# Patient Record
Sex: FEMALE | Race: BLACK OR AFRICAN AMERICAN | HISPANIC OR LATINO | URBAN - METROPOLITAN AREA
[De-identification: names, ages, dates, MRNs, and addresses within clinical notes are randomized per-mention and may not be internally consistent; named-entity substitution may affect disease eponyms.]

---

## 2024-01-03 ENCOUNTER — TELEPHONE (OUTPATIENT)
Dept: OBSTETRICS AND GYNECOLOGY | Facility: CLINIC | Age: 29
End: 2024-01-03
Payer: COMMERCIAL

## 2024-01-03 NOTE — TELEPHONE ENCOUNTER
Called patient to reschedule her appointment and left a message due to the patient being scheduled incorrectly.    KELLI PITTMAN MA

## 2024-01-19 PROBLEM — D56.3 BETA THALASSEMIA TRAIT: Status: ACTIVE | Noted: 2023-10-01

## 2024-01-19 PROBLEM — Z14.8 CARRIER OF GENETIC DISORDER: Status: ACTIVE | Noted: 2023-11-21

## 2024-01-19 PROBLEM — S62.111A: Status: RESOLVED | Noted: 2023-12-05 | Resolved: 2024-01-19

## 2024-01-22 ENCOUNTER — OFFICE VISIT (OUTPATIENT)
Dept: PRIMARY CARE | Facility: CLINIC | Age: 29
End: 2024-01-22
Payer: COMMERCIAL

## 2024-01-22 VITALS
WEIGHT: 240 LBS | SYSTOLIC BLOOD PRESSURE: 125 MMHG | HEIGHT: 67 IN | HEART RATE: 112 BPM | DIASTOLIC BLOOD PRESSURE: 75 MMHG | BODY MASS INDEX: 37.67 KG/M2

## 2024-01-22 DIAGNOSIS — J01.00 ACUTE NON-RECURRENT MAXILLARY SINUSITIS: Primary | ICD-10-CM

## 2024-01-22 DIAGNOSIS — Z34.92 SECOND TRIMESTER PREGNANCY (HHS-HCC): ICD-10-CM

## 2024-01-22 PROCEDURE — 1036F TOBACCO NON-USER: CPT | Performed by: FAMILY MEDICINE

## 2024-01-22 PROCEDURE — 99203 OFFICE O/P NEW LOW 30 MIN: CPT | Performed by: FAMILY MEDICINE

## 2024-01-22 RX ORDER — AMOXICILLIN 875 MG/1
875 TABLET, FILM COATED ORAL 2 TIMES DAILY
Qty: 20 TABLET | Refills: 0 | Status: SHIPPED | OUTPATIENT
Start: 2024-01-22 | End: 2024-02-01

## 2024-01-22 NOTE — PROGRESS NOTES
"Subjective   Patient ID: Veena Delvalle is a 28 y.o. female who presents for Sick Visit (Sinus pressure.).      HPI patient is here 28-year-old new patient is in the second trimester pregnancy pregnant with twins states that she has been sick for 3 weeks has been coughing congested has tried home remedies does not take anything over-the-counter continues to have cough cough is getting progressively worse has history of mild asthma denies being short of breath  Current Outpatient Medications on File Prior to Visit   Medication Sig Dispense Refill    -iron fum-folic acid 29 mg iron- 1 mg tablet Take by mouth.       No current facility-administered medications on file prior to visit.        Review of Systems   Constitutional:  Positive for chills, fatigue and fever. Negative for activity change.   HENT:  Positive for congestion, rhinorrhea, sinus pressure, sinus pain and sore throat. Negative for ear discharge.    Eyes:  Negative for discharge and redness.   Respiratory:  Positive for cough. Negative for shortness of breath and wheezing.    Gastrointestinal: Negative.    Genitourinary: Negative.    Musculoskeletal: Negative.    Skin: Negative.    Allergic/Immunologic: Negative.    Neurological: Negative.        Objective   Blood Pressure 125/75   Pulse (Abnormal) 112   Height 1.702 m (5' 7\")   Weight 109 kg (240 lb)   Body Mass Index 37.59 kg/m²   BSA: 2.27 meters squared  Growth percentiles: Facility age limit for growth %antonio is 20 years. Facility age limit for growth %antonio is 20 years.   No visits with results within 1 Week(s) from this visit.   Latest known visit with results is:   No results found for any previous visit.      Physical Exam  Constitutional:       Appearance: Normal appearance.   HENT:      Head: Normocephalic and atraumatic.      Nose: Congestion and rhinorrhea present.      Mouth/Throat:      Pharynx: Posterior oropharyngeal erythema present.   Eyes:      Pupils: Pupils are equal, " round, and reactive to light.   Cardiovascular:      Rate and Rhythm: Normal rate and regular rhythm.      Heart sounds: No murmur heard.  Pulmonary:      Breath sounds: No wheezing or rales.   Abdominal:      Comments: Pregnant   Musculoskeletal:      Cervical back: Normal range of motion.   Neurological:      Mental Status: She is alert.         Assessment/Plan   Problem List Items Addressed This Visit             ICD-10-CM    Acute non-recurrent maxillary sinusitis - Primary J01.00    Relevant Medications    amoxicillin (Amoxil) 875 mg tablet    Second trimester pregnancy Z34.92            .[email or web address]

## 2024-01-23 ENCOUNTER — TELEPHONE (OUTPATIENT)
Dept: OBSTETRICS AND GYNECOLOGY | Facility: CLINIC | Age: 29
End: 2024-01-23
Payer: COMMERCIAL

## 2024-01-23 NOTE — TELEPHONE ENCOUNTER
Left message for the patient to give the office a call in regards to her appointment on the 2/13/24.    KELLI PITTMAN MA

## 2024-01-24 PROBLEM — J01.00 ACUTE NON-RECURRENT MAXILLARY SINUSITIS: Status: ACTIVE | Noted: 2024-01-24

## 2024-01-24 PROBLEM — Z34.92 SECOND TRIMESTER PREGNANCY (HHS-HCC): Status: ACTIVE | Noted: 2024-01-24

## 2024-01-24 ASSESSMENT — ENCOUNTER SYMPTOMS
GASTROINTESTINAL NEGATIVE: 1
ALLERGIC/IMMUNOLOGIC NEGATIVE: 1
NEUROLOGICAL NEGATIVE: 1
EYE REDNESS: 0
SINUS PRESSURE: 1
SHORTNESS OF BREATH: 0
ACTIVITY CHANGE: 0
SINUS PAIN: 1
COUGH: 1
WHEEZING: 0
RHINORRHEA: 1
FATIGUE: 1
EYE DISCHARGE: 0
CHILLS: 1
FEVER: 1
MUSCULOSKELETAL NEGATIVE: 1
SORE THROAT: 1

## 2024-02-01 ENCOUNTER — APPOINTMENT (OUTPATIENT)
Dept: OBSTETRICS AND GYNECOLOGY | Facility: CLINIC | Age: 29
End: 2024-02-01
Payer: COMMERCIAL

## 2024-02-13 ENCOUNTER — APPOINTMENT (OUTPATIENT)
Dept: OBSTETRICS AND GYNECOLOGY | Facility: CLINIC | Age: 29
End: 2024-02-13
Payer: COMMERCIAL

## 2024-03-30 ENCOUNTER — LAB REQUISITION (OUTPATIENT)
Dept: LAB | Facility: HOSPITAL | Age: 29
End: 2024-03-30
Payer: COMMERCIAL

## 2024-03-30 DIAGNOSIS — J02.9 ACUTE PHARYNGITIS, UNSPECIFIED: ICD-10-CM

## 2024-03-30 PROCEDURE — 87651 STREP A DNA AMP PROBE: CPT

## 2024-03-31 LAB — S PYO DNA THROAT QL NAA+PROBE: NOT DETECTED

## 2024-04-01 ENCOUNTER — APPOINTMENT (OUTPATIENT)
Dept: PRIMARY CARE | Facility: CLINIC | Age: 29
End: 2024-04-01
Payer: COMMERCIAL

## 2024-05-08 ENCOUNTER — HOSPITAL ENCOUNTER (OUTPATIENT)
Facility: HOSPITAL | Age: 29
Setting detail: OBSERVATION
LOS: 1 days | Discharge: HOME | End: 2024-05-09
Attending: STUDENT IN AN ORGANIZED HEALTH CARE EDUCATION/TRAINING PROGRAM | Admitting: STUDENT IN AN ORGANIZED HEALTH CARE EDUCATION/TRAINING PROGRAM
Payer: COMMERCIAL

## 2024-05-08 ENCOUNTER — HOSPITAL ENCOUNTER (OUTPATIENT)
Facility: HOSPITAL | Age: 29
Discharge: ANOTHER HOSPITAL | End: 2024-05-08
Attending: OBSTETRICS & GYNECOLOGY | Admitting: OBSTETRICS & GYNECOLOGY
Payer: COMMERCIAL

## 2024-05-08 ENCOUNTER — APPOINTMENT (OUTPATIENT)
Dept: RADIOLOGY | Facility: HOSPITAL | Age: 29
End: 2024-05-08
Payer: COMMERCIAL

## 2024-05-08 ENCOUNTER — APPOINTMENT (OUTPATIENT)
Dept: CARDIOLOGY | Facility: HOSPITAL | Age: 29
End: 2024-05-08
Payer: COMMERCIAL

## 2024-05-08 VITALS
WEIGHT: 285 LBS | RESPIRATION RATE: 18 BRPM | TEMPERATURE: 99.1 F | HEIGHT: 67 IN | HEART RATE: 121 BPM | BODY MASS INDEX: 44.73 KG/M2 | DIASTOLIC BLOOD PRESSURE: 97 MMHG | SYSTOLIC BLOOD PRESSURE: 155 MMHG | OXYGEN SATURATION: 98 %

## 2024-05-08 DIAGNOSIS — O14.10 SEVERE PRE-ECLAMPSIA, ANTEPARTUM (HHS-HCC): ICD-10-CM

## 2024-05-08 DIAGNOSIS — R07.9 CHEST PAIN, UNSPECIFIED: ICD-10-CM

## 2024-05-08 DIAGNOSIS — R06.02 SHORTNESS OF BREATH: Primary | ICD-10-CM

## 2024-05-08 LAB
ALBUMIN SERPL BCP-MCNC: 3 G/DL (ref 3.4–5)
ALP SERPL-CCNC: 276 U/L (ref 33–110)
ALT SERPL W P-5'-P-CCNC: 22 U/L (ref 7–45)
ANION GAP SERPL CALC-SCNC: 12 MMOL/L (ref 10–20)
AST SERPL W P-5'-P-CCNC: 40 U/L (ref 9–39)
BILIRUB SERPL-MCNC: 0.4 MG/DL (ref 0–1.2)
BNP SERPL-MCNC: 362 PG/ML (ref 0–99)
BUN SERPL-MCNC: 9 MG/DL (ref 6–23)
CALCIUM SERPL-MCNC: 8.6 MG/DL (ref 8.6–10.3)
CARDIAC TROPONIN I PNL SERPL HS: 26 NG/L (ref 0–13)
CARDIAC TROPONIN I PNL SERPL HS: 28 NG/L (ref 0–13)
CHLORIDE SERPL-SCNC: 104 MMOL/L (ref 98–107)
CO2 SERPL-SCNC: 27 MMOL/L (ref 21–32)
CREAT SERPL-MCNC: 0.69 MG/DL (ref 0.5–1.05)
EGFRCR SERPLBLD CKD-EPI 2021: >90 ML/MIN/1.73M*2
ERYTHROCYTE [DISTWIDTH] IN BLOOD BY AUTOMATED COUNT: 23.9 % (ref 11.5–14.5)
GLUCOSE SERPL-MCNC: 77 MG/DL (ref 74–99)
HCT VFR BLD AUTO: 24.2 % (ref 36–46)
HGB BLD-MCNC: 7.7 G/DL (ref 12–16)
LDH SERPL L TO P-CCNC: 273 U/L (ref 84–246)
MCH RBC QN AUTO: 22.4 PG (ref 26–34)
MCHC RBC AUTO-ENTMCNC: 31.8 G/DL (ref 32–36)
MCV RBC AUTO: 70 FL (ref 80–100)
NRBC BLD-RTO: 1.1 /100 WBCS (ref 0–0)
PLATELET # BLD AUTO: 238 X10*3/UL (ref 150–450)
POTASSIUM SERPL-SCNC: 4.3 MMOL/L (ref 3.5–5.3)
PROT SERPL-MCNC: 5.3 G/DL (ref 6.4–8.2)
RBC # BLD AUTO: 3.44 X10*6/UL (ref 4–5.2)
SODIUM SERPL-SCNC: 139 MMOL/L (ref 136–145)
WBC # BLD AUTO: 13.1 X10*3/UL (ref 4.4–11.3)

## 2024-05-08 PROCEDURE — 93005 ELECTROCARDIOGRAM TRACING: CPT

## 2024-05-08 PROCEDURE — 36415 COLL VENOUS BLD VENIPUNCTURE: CPT | Performed by: NURSE PRACTITIONER

## 2024-05-08 PROCEDURE — 83615 LACTATE (LD) (LDH) ENZYME: CPT | Performed by: NURSE PRACTITIONER

## 2024-05-08 PROCEDURE — 71275 CT ANGIOGRAPHY CHEST: CPT | Performed by: RADIOLOGY

## 2024-05-08 PROCEDURE — 85027 COMPLETE CBC AUTOMATED: CPT | Performed by: NURSE PRACTITIONER

## 2024-05-08 PROCEDURE — 84484 ASSAY OF TROPONIN QUANT: CPT | Performed by: NURSE PRACTITIONER

## 2024-05-08 PROCEDURE — 99281 EMR DPT VST MAYX REQ PHY/QHP: CPT | Mod: 25

## 2024-05-08 PROCEDURE — 83880 ASSAY OF NATRIURETIC PEPTIDE: CPT | Performed by: NURSE PRACTITIONER

## 2024-05-08 PROCEDURE — 2500000006 HC RX 250 W HCPCS SELF ADMINISTERED DRUGS (ALT 637 FOR ALL PAYERS): Performed by: OBSTETRICS & GYNECOLOGY

## 2024-05-08 PROCEDURE — 80053 COMPREHEN METABOLIC PANEL: CPT | Performed by: NURSE PRACTITIONER

## 2024-05-08 PROCEDURE — 71275 CT ANGIOGRAPHY CHEST: CPT

## 2024-05-08 PROCEDURE — 2550000001 HC RX 255 CONTRASTS: Performed by: OBSTETRICS & GYNECOLOGY

## 2024-05-08 PROCEDURE — 2500000004 HC RX 250 GENERAL PHARMACY W/ HCPCS (ALT 636 FOR OP/ED): Performed by: OBSTETRICS & GYNECOLOGY

## 2024-05-08 RX ORDER — NIFEDIPINE 30 MG/1
30 TABLET, FILM COATED, EXTENDED RELEASE ORAL ONCE
Status: COMPLETED | OUTPATIENT
Start: 2024-05-08 | End: 2024-05-08

## 2024-05-08 RX ORDER — LIDOCAINE HYDROCHLORIDE 10 MG/ML
0.5 INJECTION INFILTRATION; PERINEURAL ONCE AS NEEDED
Status: DISCONTINUED | OUTPATIENT
Start: 2024-05-08 | End: 2024-05-08 | Stop reason: HOSPADM

## 2024-05-08 RX ORDER — FUROSEMIDE 10 MG/ML
40 INJECTION INTRAMUSCULAR; INTRAVENOUS ONCE
Status: COMPLETED | OUTPATIENT
Start: 2024-05-08 | End: 2024-05-08

## 2024-05-08 RX ORDER — ONDANSETRON HYDROCHLORIDE 2 MG/ML
4 INJECTION, SOLUTION INTRAVENOUS EVERY 6 HOURS PRN
Status: DISCONTINUED | OUTPATIENT
Start: 2024-05-08 | End: 2024-05-08 | Stop reason: HOSPADM

## 2024-05-08 RX ORDER — ACETAMINOPHEN 325 MG/1
975 TABLET ORAL ONCE
Status: DISCONTINUED | OUTPATIENT
Start: 2024-05-08 | End: 2024-05-08 | Stop reason: HOSPADM

## 2024-05-08 RX ORDER — NIFEDIPINE 10 MG/1
10 CAPSULE ORAL ONCE AS NEEDED
Status: DISCONTINUED | OUTPATIENT
Start: 2024-05-08 | End: 2024-05-08 | Stop reason: HOSPADM

## 2024-05-08 RX ORDER — HYDRALAZINE HYDROCHLORIDE 20 MG/ML
5 INJECTION INTRAMUSCULAR; INTRAVENOUS ONCE AS NEEDED
Status: DISCONTINUED | OUTPATIENT
Start: 2024-05-08 | End: 2024-05-08 | Stop reason: HOSPADM

## 2024-05-08 RX ORDER — LABETALOL HYDROCHLORIDE 5 MG/ML
20 INJECTION, SOLUTION INTRAVENOUS ONCE AS NEEDED
Status: DISCONTINUED | OUTPATIENT
Start: 2024-05-08 | End: 2024-05-08 | Stop reason: HOSPADM

## 2024-05-08 RX ORDER — ONDANSETRON 4 MG/1
4 TABLET, FILM COATED ORAL EVERY 6 HOURS PRN
Status: DISCONTINUED | OUTPATIENT
Start: 2024-05-08 | End: 2024-05-08 | Stop reason: HOSPADM

## 2024-05-08 RX ORDER — IBUPROFEN 600 MG/1
600 TABLET ORAL ONCE
Status: DISCONTINUED | OUTPATIENT
Start: 2024-05-08 | End: 2024-05-08 | Stop reason: HOSPADM

## 2024-05-08 RX ADMIN — FUROSEMIDE 40 MG: 10 INJECTION, SOLUTION INTRAMUSCULAR; INTRAVENOUS at 20:59

## 2024-05-08 RX ADMIN — IOHEXOL 75 ML: 350 INJECTION, SOLUTION INTRAVENOUS at 19:41

## 2024-05-08 RX ADMIN — NIFEDIPINE 30 MG: 30 TABLET, EXTENDED RELEASE ORAL at 20:54

## 2024-05-08 ASSESSMENT — PAIN DESCRIPTION - LOCATION: LOCATION: CHEST

## 2024-05-08 ASSESSMENT — PAIN SCALES - GENERAL
PAINLEVEL_OUTOF10: 0 - NO PAIN
PAINLEVEL: 8
PAINLEVEL_OUTOF10: 7

## 2024-05-08 ASSESSMENT — COLUMBIA-SUICIDE SEVERITY RATING SCALE - C-SSRS
2. HAVE YOU ACTUALLY HAD ANY THOUGHTS OF KILLING YOURSELF?: NO
6. HAVE YOU EVER DONE ANYTHING, STARTED TO DO ANYTHING, OR PREPARED TO DO ANYTHING TO END YOUR LIFE?: NO
1. IN THE PAST MONTH, HAVE YOU WISHED YOU WERE DEAD OR WISHED YOU COULD GO TO SLEEP AND NOT WAKE UP?: NO

## 2024-05-08 ASSESSMENT — PAIN - FUNCTIONAL ASSESSMENT: PAIN_FUNCTIONAL_ASSESSMENT: 0-10
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
## 2024-05-08 NOTE — PROGRESS NOTES
Pt resting, oxygen saturation %  Labs:                  Troponin I- 28          Hgb 7.7, WBC 13, Plts 238          AST 40, ALT 22,     CT PE and echo ordered stat. Pt and  updated.

## 2024-05-08 NOTE — ED TRIAGE NOTES
Pt arrived to ED with c/o CP and SOB starting to day. Pt is 4 days post partum from having twins via . Pt ambulatory and has no additional complaints.

## 2024-05-09 ENCOUNTER — PHARMACY VISIT (OUTPATIENT)
Dept: PHARMACY | Facility: CLINIC | Age: 29
End: 2024-05-09
Payer: COMMERCIAL

## 2024-05-09 ENCOUNTER — APPOINTMENT (OUTPATIENT)
Dept: CARDIOLOGY | Facility: HOSPITAL | Age: 29
End: 2024-05-09
Payer: COMMERCIAL

## 2024-05-09 VITALS
BODY MASS INDEX: 44.64 KG/M2 | TEMPERATURE: 97.5 F | RESPIRATION RATE: 18 BRPM | SYSTOLIC BLOOD PRESSURE: 141 MMHG | WEIGHT: 284.39 LBS | HEART RATE: 80 BPM | OXYGEN SATURATION: 97 % | DIASTOLIC BLOOD PRESSURE: 99 MMHG | HEIGHT: 67 IN

## 2024-05-09 PROBLEM — O14.10 PRE-ECLAMPSIA, SEVERE (HHS-HCC): Status: ACTIVE | Noted: 2024-05-09

## 2024-05-09 LAB
ABO GROUP (TYPE) IN BLOOD: NORMAL
ALBUMIN SERPL BCP-MCNC: 3 G/DL (ref 3.4–5)
ALP SERPL-CCNC: 263 U/L (ref 33–110)
ALT SERPL W P-5'-P-CCNC: 20 U/L (ref 7–45)
ANION GAP SERPL CALC-SCNC: 14 MMOL/L (ref 10–20)
ANTIBODY SCREEN: NORMAL
AORTIC VALVE PEAK VELOCITY: 1.61 M/S
AST SERPL W P-5'-P-CCNC: 34 U/L (ref 9–39)
ATRIAL RATE: 92 BPM
AV PEAK GRADIENT: 10.4 MMHG
AVA (PEAK VEL): 2.69 CM2
BILIRUB SERPL-MCNC: 0.5 MG/DL (ref 0–1.2)
BUN SERPL-MCNC: 8 MG/DL (ref 6–23)
CALCIUM SERPL-MCNC: 8.7 MG/DL (ref 8.6–10.6)
CARDIAC TROPONIN I PNL SERPL HS: 23 NG/L (ref 0–34)
CHLORIDE SERPL-SCNC: 103 MMOL/L (ref 98–107)
CO2 SERPL-SCNC: 28 MMOL/L (ref 21–32)
CREAT SERPL-MCNC: 0.57 MG/DL (ref 0.5–1.05)
EGFRCR SERPLBLD CKD-EPI 2021: >90 ML/MIN/1.73M*2
EJECTION FRACTION APICAL 4 CHAMBER: 66
ERYTHROCYTE [DISTWIDTH] IN BLOOD BY AUTOMATED COUNT: 24 % (ref 11.5–14.5)
GLUCOSE SERPL-MCNC: 92 MG/DL (ref 74–99)
HCT VFR BLD AUTO: 24.2 % (ref 36–46)
HGB BLD-MCNC: 7.6 G/DL (ref 12–16)
LEFT VENTRICLE INTERNAL DIMENSION DIASTOLE: 5.46 CM (ref 3.5–6)
LEFT VENTRICULAR OUTFLOW TRACT DIAMETER: 2.2 CM
LV EJECTION FRACTION BIPLANE: 64 %
MCH RBC QN AUTO: 22.2 PG (ref 26–34)
MCHC RBC AUTO-ENTMCNC: 31.4 G/DL (ref 32–36)
MCV RBC AUTO: 71 FL (ref 80–100)
MITRAL VALVE E/A RATIO: 1.63
MITRAL VALVE E/E' RATIO: 8.08
NRBC BLD-RTO: 1.2 /100 WBCS (ref 0–0)
P AXIS: 56 DEGREES
P OFFSET: 203 MS
P ONSET: 150 MS
PLATELET # BLD AUTO: 271 X10*3/UL (ref 150–450)
POTASSIUM SERPL-SCNC: 3.6 MMOL/L (ref 3.5–5.3)
PR INTERVAL: 142 MS
PROT SERPL-MCNC: 6 G/DL (ref 6.4–8.2)
Q ONSET: 221 MS
QRS COUNT: 15 BEATS
QRS DURATION: 76 MS
QT INTERVAL: 348 MS
QTC CALCULATION(BAZETT): 430 MS
QTC FREDERICIA: 401 MS
R AXIS: 45 DEGREES
RBC # BLD AUTO: 3.43 X10*6/UL (ref 4–5.2)
RH FACTOR (ANTIGEN D): NORMAL
RIGHT VENTRICLE FREE WALL PEAK S': 15.4 CM/S
RIGHT VENTRICLE PEAK SYSTOLIC PRESSURE: 27.9 MMHG
SODIUM SERPL-SCNC: 141 MMOL/L (ref 136–145)
T AXIS: 49 DEGREES
T OFFSET: 395 MS
TRICUSPID ANNULAR PLANE SYSTOLIC EXCURSION: 3 CM
VENTRICULAR RATE: 92 BPM
WBC # BLD AUTO: 13.4 X10*3/UL (ref 4.4–11.3)

## 2024-05-09 PROCEDURE — 36415 COLL VENOUS BLD VENIPUNCTURE: CPT

## 2024-05-09 PROCEDURE — 1210000001 HC SEMI-PRIVATE ROOM DAILY

## 2024-05-09 PROCEDURE — 2500000001 HC RX 250 WO HCPCS SELF ADMINISTERED DRUGS (ALT 637 FOR MEDICARE OP): Performed by: STUDENT IN AN ORGANIZED HEALTH CARE EDUCATION/TRAINING PROGRAM

## 2024-05-09 PROCEDURE — 2500000004 HC RX 250 GENERAL PHARMACY W/ HCPCS (ALT 636 FOR OP/ED): Performed by: STUDENT IN AN ORGANIZED HEALTH CARE EDUCATION/TRAINING PROGRAM

## 2024-05-09 PROCEDURE — 84075 ASSAY ALKALINE PHOSPHATASE: CPT

## 2024-05-09 PROCEDURE — 93306 TTE W/DOPPLER COMPLETE: CPT | Performed by: SPECIALIST

## 2024-05-09 PROCEDURE — 2500000001 HC RX 250 WO HCPCS SELF ADMINISTERED DRUGS (ALT 637 FOR MEDICARE OP)

## 2024-05-09 PROCEDURE — 2500000004 HC RX 250 GENERAL PHARMACY W/ HCPCS (ALT 636 FOR OP/ED)

## 2024-05-09 PROCEDURE — RXMED WILLOW AMBULATORY MEDICATION CHARGE

## 2024-05-09 PROCEDURE — G0378 HOSPITAL OBSERVATION PER HR: HCPCS

## 2024-05-09 PROCEDURE — 85027 COMPLETE CBC AUTOMATED: CPT

## 2024-05-09 PROCEDURE — 99239 HOSP IP/OBS DSCHRG MGMT >30: CPT

## 2024-05-09 PROCEDURE — 86901 BLOOD TYPING SEROLOGIC RH(D): CPT

## 2024-05-09 PROCEDURE — 99222 1ST HOSP IP/OBS MODERATE 55: CPT

## 2024-05-09 PROCEDURE — 2500000006 HC RX 250 W HCPCS SELF ADMINISTERED DRUGS (ALT 637 FOR ALL PAYERS): Performed by: STUDENT IN AN ORGANIZED HEALTH CARE EDUCATION/TRAINING PROGRAM

## 2024-05-09 PROCEDURE — 93306 TTE W/DOPPLER COMPLETE: CPT

## 2024-05-09 PROCEDURE — 84484 ASSAY OF TROPONIN QUANT: CPT

## 2024-05-09 RX ORDER — METOCLOPRAMIDE HYDROCHLORIDE 5 MG/ML
10 INJECTION INTRAMUSCULAR; INTRAVENOUS ONCE
Status: DISCONTINUED | OUTPATIENT
Start: 2024-05-09 | End: 2024-05-09 | Stop reason: HOSPADM

## 2024-05-09 RX ORDER — METHYLERGONOVINE MALEATE 0.2 MG/ML
0.2 INJECTION INTRAVENOUS ONCE AS NEEDED
Status: DISCONTINUED | OUTPATIENT
Start: 2024-05-09 | End: 2024-05-09 | Stop reason: HOSPADM

## 2024-05-09 RX ORDER — DIPHENHYDRAMINE HYDROCHLORIDE 50 MG/ML
25 INJECTION INTRAMUSCULAR; INTRAVENOUS ONCE
Status: COMPLETED | OUTPATIENT
Start: 2024-05-09 | End: 2024-05-09

## 2024-05-09 RX ORDER — NIFEDIPINE 10 MG/1
10 CAPSULE ORAL ONCE AS NEEDED
Status: DISCONTINUED | OUTPATIENT
Start: 2024-05-09 | End: 2024-05-09 | Stop reason: HOSPADM

## 2024-05-09 RX ORDER — FERROUS SULFATE 325(65) MG
325 TABLET ORAL
Qty: 30 TABLET | Refills: 0 | Status: SHIPPED | OUTPATIENT
Start: 2024-05-09 | End: 2024-06-08

## 2024-05-09 RX ORDER — LABETALOL HYDROCHLORIDE 5 MG/ML
20 INJECTION, SOLUTION INTRAVENOUS ONCE AS NEEDED
Status: DISCONTINUED | OUTPATIENT
Start: 2024-05-09 | End: 2024-05-09 | Stop reason: HOSPADM

## 2024-05-09 RX ORDER — POLYETHYLENE GLYCOL 3350 17 G/17G
17 POWDER, FOR SOLUTION ORAL 2 TIMES DAILY PRN
Status: DISCONTINUED | OUTPATIENT
Start: 2024-05-09 | End: 2024-05-09 | Stop reason: HOSPADM

## 2024-05-09 RX ORDER — DIPHENHYDRAMINE HYDROCHLORIDE 50 MG/ML
INJECTION INTRAMUSCULAR; INTRAVENOUS
Status: COMPLETED
Start: 2024-05-09 | End: 2024-05-09

## 2024-05-09 RX ORDER — SIMETHICONE 80 MG
80 TABLET,CHEWABLE ORAL 4 TIMES DAILY PRN
Status: DISCONTINUED | OUTPATIENT
Start: 2024-05-09 | End: 2024-05-09 | Stop reason: HOSPADM

## 2024-05-09 RX ORDER — IBUPROFEN 600 MG/1
600 TABLET ORAL EVERY 6 HOURS SCHEDULED
Status: DISCONTINUED | OUTPATIENT
Start: 2024-05-09 | End: 2024-05-09 | Stop reason: HOSPADM

## 2024-05-09 RX ORDER — FUROSEMIDE 20 MG/1
20 TABLET ORAL DAILY
Qty: 2 TABLET | Refills: 0 | Status: SHIPPED | OUTPATIENT
Start: 2024-05-10 | End: 2024-05-12

## 2024-05-09 RX ORDER — ADHESIVE BANDAGE
30 BANDAGE TOPICAL
Status: DISCONTINUED | OUTPATIENT
Start: 2024-05-09 | End: 2024-05-09 | Stop reason: HOSPADM

## 2024-05-09 RX ORDER — LOPERAMIDE HYDROCHLORIDE 2 MG/1
4 CAPSULE ORAL EVERY 2 HOUR PRN
Status: DISCONTINUED | OUTPATIENT
Start: 2024-05-09 | End: 2024-05-09 | Stop reason: HOSPADM

## 2024-05-09 RX ORDER — HYDRALAZINE HYDROCHLORIDE 20 MG/ML
5 INJECTION INTRAMUSCULAR; INTRAVENOUS ONCE AS NEEDED
Status: DISCONTINUED | OUTPATIENT
Start: 2024-05-09 | End: 2024-05-09 | Stop reason: HOSPADM

## 2024-05-09 RX ORDER — MISOPROSTOL 200 UG/1
800 TABLET ORAL ONCE AS NEEDED
Status: DISCONTINUED | OUTPATIENT
Start: 2024-05-09 | End: 2024-05-09 | Stop reason: HOSPADM

## 2024-05-09 RX ORDER — FAMOTIDINE 20 MG/1
20 TABLET, FILM COATED ORAL DAILY
Status: DISCONTINUED | OUTPATIENT
Start: 2024-05-09 | End: 2024-05-09 | Stop reason: HOSPADM

## 2024-05-09 RX ORDER — ONDANSETRON HYDROCHLORIDE 2 MG/ML
4 INJECTION, SOLUTION INTRAVENOUS EVERY 6 HOURS PRN
Status: DISCONTINUED | OUTPATIENT
Start: 2024-05-09 | End: 2024-05-09 | Stop reason: HOSPADM

## 2024-05-09 RX ORDER — TRANEXAMIC ACID 100 MG/ML
1000 INJECTION, SOLUTION INTRAVENOUS ONCE AS NEEDED
Status: DISCONTINUED | OUTPATIENT
Start: 2024-05-09 | End: 2024-05-09 | Stop reason: HOSPADM

## 2024-05-09 RX ORDER — NIFEDIPINE 30 MG/1
30 TABLET, FILM COATED, EXTENDED RELEASE ORAL
Qty: 30 TABLET | Refills: 0 | Status: SHIPPED | OUTPATIENT
Start: 2024-05-09 | End: 2024-06-08

## 2024-05-09 RX ORDER — METOCLOPRAMIDE 10 MG/1
10 TABLET ORAL EVERY 6 HOURS PRN
Status: DISCONTINUED | OUTPATIENT
Start: 2024-05-09 | End: 2024-05-09 | Stop reason: HOSPADM

## 2024-05-09 RX ORDER — SODIUM CHLORIDE, SODIUM LACTATE, POTASSIUM CHLORIDE, CALCIUM CHLORIDE 600; 310; 30; 20 MG/100ML; MG/100ML; MG/100ML; MG/100ML
125 INJECTION, SOLUTION INTRAVENOUS CONTINUOUS
Status: DISCONTINUED | OUTPATIENT
Start: 2024-05-09 | End: 2024-05-09 | Stop reason: HOSPADM

## 2024-05-09 RX ORDER — METOCLOPRAMIDE HYDROCHLORIDE 5 MG/ML
10 INJECTION INTRAMUSCULAR; INTRAVENOUS EVERY 6 HOURS PRN
Status: DISCONTINUED | OUTPATIENT
Start: 2024-05-09 | End: 2024-05-09 | Stop reason: HOSPADM

## 2024-05-09 RX ORDER — ONDANSETRON 4 MG/1
4 TABLET, FILM COATED ORAL EVERY 6 HOURS PRN
Status: DISCONTINUED | OUTPATIENT
Start: 2024-05-09 | End: 2024-05-09 | Stop reason: HOSPADM

## 2024-05-09 RX ORDER — CARBOPROST TROMETHAMINE 250 UG/ML
250 INJECTION, SOLUTION INTRAMUSCULAR ONCE AS NEEDED
Status: DISCONTINUED | OUTPATIENT
Start: 2024-05-09 | End: 2024-05-09 | Stop reason: HOSPADM

## 2024-05-09 RX ORDER — DIPHENHYDRAMINE HYDROCHLORIDE 50 MG/ML
25 INJECTION INTRAMUSCULAR; INTRAVENOUS EVERY 6 HOURS PRN
Status: DISCONTINUED | OUTPATIENT
Start: 2024-05-09 | End: 2024-05-09 | Stop reason: HOSPADM

## 2024-05-09 RX ORDER — ACETAMINOPHEN 325 MG/1
975 TABLET ORAL EVERY 6 HOURS SCHEDULED
Status: DISCONTINUED | OUTPATIENT
Start: 2024-05-09 | End: 2024-05-09 | Stop reason: HOSPADM

## 2024-05-09 RX ORDER — BISACODYL 10 MG/1
10 SUPPOSITORY RECTAL DAILY PRN
Status: DISCONTINUED | OUTPATIENT
Start: 2024-05-09 | End: 2024-05-09 | Stop reason: HOSPADM

## 2024-05-09 RX ORDER — ENOXAPARIN SODIUM 100 MG/ML
60 INJECTION SUBCUTANEOUS EVERY 24 HOURS
Status: DISCONTINUED | OUTPATIENT
Start: 2024-05-10 | End: 2024-05-09 | Stop reason: HOSPADM

## 2024-05-09 RX ORDER — NIFEDIPINE 30 MG/1
30 TABLET, FILM COATED, EXTENDED RELEASE ORAL
Status: DISCONTINUED | OUTPATIENT
Start: 2024-05-09 | End: 2024-05-09 | Stop reason: HOSPADM

## 2024-05-09 RX ORDER — FUROSEMIDE 20 MG/1
20 TABLET ORAL DAILY
Status: DISCONTINUED | OUTPATIENT
Start: 2024-05-09 | End: 2024-05-09 | Stop reason: HOSPADM

## 2024-05-09 RX ORDER — OXYTOCIN 10 [USP'U]/ML
10 INJECTION, SOLUTION INTRAMUSCULAR; INTRAVENOUS ONCE AS NEEDED
Status: DISCONTINUED | OUTPATIENT
Start: 2024-05-09 | End: 2024-05-09 | Stop reason: HOSPADM

## 2024-05-09 RX ORDER — NIFEDIPINE 30 MG/1
30 TABLET, FILM COATED, EXTENDED RELEASE ORAL
Status: DISCONTINUED | OUTPATIENT
Start: 2024-05-09 | End: 2024-05-09

## 2024-05-09 RX ADMIN — ACETAMINOPHEN 975 MG: 325 TABLET ORAL at 10:03

## 2024-05-09 RX ADMIN — ACETAMINOPHEN 975 MG: 325 TABLET ORAL at 16:28

## 2024-05-09 RX ADMIN — IBUPROFEN 600 MG: 600 TABLET, FILM COATED ORAL at 06:03

## 2024-05-09 RX ADMIN — NIFEDIPINE 30 MG: 30 TABLET, FILM COATED, EXTENDED RELEASE ORAL at 16:29

## 2024-05-09 RX ADMIN — METOCLOPRAMIDE 10 MG: 5 INJECTION, SOLUTION INTRAMUSCULAR; INTRAVENOUS at 00:24

## 2024-05-09 RX ADMIN — IBUPROFEN 600 MG: 600 TABLET, FILM COATED ORAL at 01:03

## 2024-05-09 RX ADMIN — IRON SUCROSE 300 MG: 20 INJECTION, SOLUTION INTRAVENOUS at 10:04

## 2024-05-09 RX ADMIN — DIPHENHYDRAMINE HYDROCHLORIDE 25 MG: 50 INJECTION, SOLUTION INTRAMUSCULAR; INTRAVENOUS at 00:24

## 2024-05-09 RX ADMIN — DIPHENHYDRAMINE HYDROCHLORIDE 25 MG: 50 INJECTION INTRAMUSCULAR; INTRAVENOUS at 07:01

## 2024-05-09 RX ADMIN — FUROSEMIDE 20 MG: 20 TABLET ORAL at 10:03

## 2024-05-09 RX ADMIN — IBUPROFEN 600 MG: 600 TABLET, FILM COATED ORAL at 12:30

## 2024-05-09 RX ADMIN — METOCLOPRAMIDE 10 MG: 5 INJECTION, SOLUTION INTRAMUSCULAR; INTRAVENOUS at 07:02

## 2024-05-09 RX ADMIN — ACETAMINOPHEN 975 MG: 325 TABLET ORAL at 04:12

## 2024-05-09 ASSESSMENT — PAIN SCALES - GENERAL
PAINLEVEL_OUTOF10: 0 - NO PAIN
PAINLEVEL_OUTOF10: 5 - MODERATE PAIN
PAINLEVEL_OUTOF10: 0 - NO PAIN
PAINLEVEL_OUTOF10: 0 - NO PAIN
PAINLEVEL_OUTOF10: 7
PAINLEVEL_OUTOF10: 7
PAINLEVEL_OUTOF10: 0 - NO PAIN
PAINLEVEL_OUTOF10: 0 - NO PAIN

## 2024-05-09 ASSESSMENT — PAIN DESCRIPTION - LOCATION
LOCATION: HEAD

## 2024-05-09 ASSESSMENT — PAIN DESCRIPTION - DESCRIPTORS
DESCRIPTORS: HEADACHE
DESCRIPTORS: HEADACHE

## 2024-05-09 NOTE — PROGRESS NOTES
Notifed by RN that patient is in discomfort. Requesting orders for motrin and tylenol. Last dose of motrin taken at 1430 this afternoon. Has not had tylenol today.  Also c/o discomfort at the urethral catheter insertion site. Patient is POD #4 LTCS and is likley irritated from previously inserted catheter.  Plan for ice to and dermoplast to help with discomfort. Ordered as discussed. Patient to be transferred to [facility] at 2300 per plan.

## 2024-05-09 NOTE — LACTATION NOTE
Lactation Consultant Note  Lactation Consultation       Maternal Information       Maternal Assessment       Infant Assessment       Feeding Assessment       LATCH TOOL       Breast Pump       Other OB Lactation Tools       Patient Follow-up       Other OB Lactation Documentation       Recommendations/Summary  Mom stated she is breast feeding the twins first and then feeding pumped breast milk to them. Then she pumps.   She is postpartum day #5 and denies having any questions or concerns at this time.     Encouraged her to call for assistance if she needs/ or wants assistance.   Mom verbalized understanding.

## 2024-05-09 NOTE — PROGRESS NOTES
Postpartum Progress Note    Assessment/Plan   Veena Delvalle is a 28 y.o., , presenting at postpartum day 5 after 37wk twin delivery with HTN, headache, chest pain, and shortness of breath.       Postpartum sPEC  - diagnosed by mild range Bps and pulmonary edema on imaging  - O2 sat within normal limits on RA  - Patient also with 7/10 headache, received Tylenol prior to transfer, will treat with benadryl, reglan and ibuprofen  - Given remote from delivery, will defer magnesium gtt however if unrelenting GARCIA will plan to start  - HELLP labs notable for Hgb 7.9, otherwise wnl, repeat HELLP labs pending    Chest pain  - Differential diagnosis includes peripartum cardiomyopathy, sPEC, GERD  -  , will plan for Echo in the AM  - EKG sinus tachycardia  - Troponin slightly elevated at OSH, will continue to trend  - Started on Protonix    Postpartum  - breastfeeding  - PPBC: declines  - Postpartum course c/b PPH requiring 2upRBC, Hgb 7.9 on admission -> will give IV iron    Dispo: Admit for observation    Seen and discussed with Dr. Sachin Linder MD PGY-2        Subjective     27 yo  POD 5 s/p pLTCS in setting of di-di twins transferred from OSH due to sPEC and pulmonary edema. She presented to the ED today reporting of headache, chest pain and SOB. Headache began around 5 PM on  and is located anteriorly and is not throbbing in character. Tylenol has provided some relief, with the pain described as 7/10 currently down from peak of 8/10. Denies history of any similar headache. Pt describes her chest pain as a heaviness in her chest, which she reports feeling near the end of her pregnancy but now worsens along with SOB with movement. Pt has difficulty laying flat due to SOB. Patient also reports feeling lightheadedness with onset during her ED stay.     ED course notable for elevated troponin (28 -> 26), . CTPE without evidence of PE however with trace right pleural effusion and mild  interstitial edema. High mild range and 1 severe BP during admission. She was started on nifedipine 30 and IV lasix 40mg.      Objective     Allergies:   Patient has no known allergies.         Last Vitals:  Temp Pulse Resp BP MAP Pulse Ox   36.9 °C (98.4 °F) (!) 115 18 135/80   96 %     Vitals Min/Max Last 24 Hours:  Temp  Min: 36 °C (96.8 °F)  Max: 37.3 °C (99.1 °F)  Pulse  Min: 92  Max: 137  Resp  Min: 16  Max: 18  BP  Min: 133/78  Max: 166/86    Intake/Output:   No intake or output data in the 24 hours ending 05/09/24 0019    Physical Exam:  General: no acute distress  HEENT: normocephalic, atraumatic  Heart: warm and well perfused  Lungs: breathing comfortably on room air  Abdomen: non distended  Extremities: moving all extremities  Neuro: awake and conversant  Psych: appropriate mood and affect

## 2024-05-09 NOTE — SIGNIFICANT EVENT
To bedside for check-in. Patient reports headache and chest pain now resolved. Patient reports improvement in orthopnea and SOB as well. BP wnl. Stable for transfer to [facility].    Seen and discussed with Dr. Maris Linder MD PGY-2

## 2024-05-09 NOTE — DISCHARGE SUMMARY
Discharge Summary    Admission Date: 2024  Discharge Date: 2024    Discharge Diagnosis  Labor and delivery indication for care or intervention ([facility])    Hospital Course    28 y.o.,  admitted for newly diagnosed postpartum sPEC. Was diagnosed based on pulmonary edema on imaging and elevated BP. Was given 1x IV lasix and then started on PO course x3 days. Also was started on nifed 30 daily. Troponin elevated but downtrending. BNP elevated, echo obtained which was wnl. CP and SOB improved after lasix administration. Pt had HA which resolved with medications, therefore decision was made not to adminsiter postpartum Mag remote from delivery. After normal echo resulted, and pt's BP monitored for 1 day and found to be well-controlled, pt discharged home. Will continue nifed 30 daily and last 2 days of lasix course. Has appt 5/10 with primary OB for follow up.    Please see additional details from progress note  below:    Veena Delvalle is a 28 y.o., , who delivered at Unknown gestation and is now postpartum day 5 after 37wk twin delivery with HTN, headache, chest pain, and shortness of breath.      Postpartum sPEC  - diagnosed by mild range BP and pulmonary edema on imaging  - Cont nifedipine 30mg  - O2 sat within normal limits on RA  - Patient initially with 7/10 headache -> resolved after PO meds  - HELLP labs notable for Hgb 7.6, otherwise wnl x2  - BP currently normotensive     Chest pain  - Differential diagnosis includes peripartum cardiomyopathy, sPEC, GERD. Symptoms have now resolved, favoring that etiology was due to pulmonary edema/sPEC which was treated  - , echo wnl  - EKG sinus tachycardia  - Troponin slightly elevated at OSH, but now downtrending  - Cont Protonix     Postpartum  - breastfeeding  - PPBC: declines  - Postpartum course c/b PPH requiring 2upRBC, Hgb 7.9 on admission -> given IV iron, will dc with PO Fe    Pertinent Physical Exam At Time of  Discharge  Constitutional: No visible distress, alert and cooperative  Respiratory/Thorax: Normal respiratory effort on RA. Lungs CTAB.  Cardiovascular: RRR  Gastrointestinal: soft, nondistended, nontender  Psychological: Appropriate mood and behavior    Discharge Meds     Your medication list        START taking these medications        Instructions Last Dose Given Next Dose Due   furosemide 20 mg tablet  Commonly known as: Lasix  Start taking on: May 10, 2024      Take 1 tablet (20 mg) by mouth once daily for 2 doses.       NIFEdipine ER 30 mg 24 hr tablet  Commonly known as: Adalat CC      Take 1 tablet (30 mg) by mouth once daily in the evening. Take with meals. Do not crush, chew, or split.              STOP taking these medications      -iron fum-folic acid 29 mg iron- 1 mg tablet                  Where to Get Your Medications        These medications were sent to [facility]  [address], [address], [place] [address]      Hours: 8AM to 6PM Mon-Fri, 8AM to 4PM Sat, 9AM to 1PM Sun Phone: [phone]   furosemide 20 mg tablet  NIFEdipine ER 30 mg 24 hr tablet          Complications Requiring Follow-Up  None    Test Results Pending At Discharge  Pending Labs       No current pending labs.            Outpatient Follow-Up  No future appointments.        Lesli Rowe MD

## 2024-05-09 NOTE — SIGNIFICANT EVENT
BP Cuff and Home Monitoring  Patient meets criteria for home monitoring of blood pressure post discharge.  Reason: current preeclampsia with severe features.  Met with patient to assess for availability of home BP monitor.  Patient stated she owns home BP monitor. . Patient educated on importance of continuing to monitor BP at home, recording BP on home monitoring log and s/sx of when to call her provider.  Pt verbalized understanding the above information.

## 2024-05-09 NOTE — PROGRESS NOTES
Postpartum Progress Note    Assessment/Plan   Veena Delvalle is a 28 y.o., , who delivered at Unknown gestation and is now postpartum day 4.    POD #4: s/p LTCS for twins at [facility]  sPEC:  patient with GHTN at [facility] with no severe features.  Discharged yesterday and presents here with shortness of breath and chest pain and pressure.  Both are present at rest, worse with activity.  Patient also with leg and hand swelling.  Patient had a headache earlier which resolved spontaneously.  On exam patient with 4+ edema, and no rales.  Labs were significant for an elevated BNP and troponin level.  Her chest CT was negative for a PE, heart with upper limit of normal, small pericardial effusion, mild interstitial edema, and minimal right pleural effusion.  Patient with probable severe preeclampsia, rule out cardiomyopathy.  Discussed with Dr. Stephenson at [facility].  Will give patient Lasix 40 mg IV, Procardia XL 30 mg oral, and transfer to [facility].  Patient is agreeable.
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
## 2024-05-09 NOTE — DISCHARGE INSTRUCTIONS

## 2024-05-09 NOTE — PROGRESS NOTES
Postpartum Progress Note    Assessment/Plan   Veena Delvalle is a 28 y.o., , who delivered at Unknown gestation and is now postpartum day 5 after 37wk twin delivery with HTN, headache, chest pain, and shortness of breath.      Postpartum sPEC  - diagnosed by mild range BP and pulmonary edema on imaging  - Cont nifedipine 30mg  - O2 sat within normal limits on RA  - Patient initially with 7/10 headache -> Tylenol, benadryl, reglan and ibuprofen -> HA resolved. This morning GARCIA is back, now 7.5/10. Will treat with PO meds again and fu. Discussed with patient that magnesium tx may be indicated if HA does not go away. Pt amenable to Mg if needed.  - HELLP labs notable for Hgb 7.6, otherwise wnl x2  - BP currently normotensive     Chest pain  - Differential diagnosis includes peripartum cardiomyopathy, sPEC, GERD. Symptoms have now resolved, favoring that etiology was due to pulmonary edema/sPEC which was treated  - , echo ordered  - EKG sinus tachycardia  - Troponin slightly elevated at OSH, but now downtrending  - Cont Protonix     Postpartum  - breastfeeding  - PPBC: declines  - Postpartum course c/b PPH requiring 2upRBC, Hgb 7.9 on admission -> will give IV iron    Pt seen and d/w Dr. Libby Rwoe MD PGY-2      Principal Problem:    Labor and delivery indication for care or intervention ([facility])      Subjective   Patient resting comfortably in bed. Denies scotoma, RUQ pain, SOB, chest pain. Reports that HA went completed away earlier in the night, but has now recurred and is 7.5/10 in the left frontal area. Does not have a hx of migraines or similar headaches. Denies abdominal pain. Breastfeeding.      Objective   Allergies:   Patient has no known allergies.         Last Vitals:  Temp Pulse Resp BP MAP Pulse Ox   36.1 °C (97 °F) 100 18 130/81   96 %     Vitals Min/Max Last 24 Hours:  Temp  Min: 36 °C (96.8 °F)  Max: 37.3 °C (99.1 °F)  Pulse  Min: 92  Max: 137  Resp  Min: 16  Max: 18  BP  Min:  128/77  Max: 166/86    Intake/Output:     Intake/Output Summary (Last 24 hours) at 5/9/2024 0809  Last data filed at 5/9/2024 0000  Gross per 24 hour   Intake --   Output 1100 ml   Net -1100 ml       Physical Exam:  Constitutional: No visible distress, alert and cooperative  Respiratory/Thorax: Normal respiratory effort on RA. Lungs CTAB.  Cardiovascular: RRR  Gastrointestinal: soft, nondistended, nontender  Psychological: Appropriate mood and behavior      Lab Data:  Lab Results   Component Value Date    WBC 13.4 (H) 05/09/2024    HGB 7.6 (L) 05/09/2024    HCT 24.2 (L) 05/09/2024     05/09/2024     Lab Results   Component Value Date    GLUCOSE 92 05/09/2024     05/09/2024    K 3.6 05/09/2024     05/09/2024    CO2 28 05/09/2024    ANIONGAP 14 05/09/2024    BUN 8 05/09/2024    CREATININE 0.57 05/09/2024    EGFR >90 05/09/2024    CALCIUM 8.7 05/09/2024    ALBUMIN 3.0 (L) 05/09/2024    PROT 6.0 (L) 05/09/2024    ALKPHOS 263 (H) 05/09/2024    ALT 20 05/09/2024    AST 34 05/09/2024    BILITOT 0.5 05/09/2024

## 2024-05-09 NOTE — CARE PLAN
Problem: Pain  Goal: Takes deep breaths with improved pain control throughout the shift  Outcome: Progressing  Goal: Turns in bed with improved pain control throughout the shift  Outcome: Met  Goal: Walks with improved pain control throughout the shift  Outcome: Met  Goal: Performs ADL's with improved pain control throughout shift  Outcome: Met  Goal: Participates in PT with improved pain control throughout the shift  Outcome: Met  Goal: Free from opioid side effects throughout the shift  Outcome: Met  Goal: Free from acute confusion related to pain meds throughout the shift  Outcome: Met     Problem: Postpartum  Goal: Experiences normal postpartum course  Outcome: Met  Goal: Appropriate maternal -  bonding  Outcome: Met  Goal: Establish and maintain infant feeding pattern for adequate nutrition  Outcome: Met  Goal: Incisions, wounds, or drain sites healing without S/S of infection  Outcome: Met  Goal: No s/sx infection  Outcome: Met  Goal: No s/sx of hemorrhage  Outcome: Met  Goal: Minimal s/sx of HDP and BP<160/110  Outcome: Progressing     Problem: Discharge Planning  Goal: Discharge to home or other facility with appropriate resources  Outcome: Progressing     Problem: Chronic Conditions and Co-morbidities  Goal: Patient's chronic conditions and co-morbidity symptoms are monitored and maintained or improved  Outcome: Progressing   The patient's goals for the shift include Manage pain and discomfort    The clinical goals for the shift include Maintain BP <160/110 with minimal to no signs of HA, SOB and chest pain.

## 2024-05-31 NOTE — DOCUMENTATION CLARIFICATION NOTE
"    PATIENT:               GIRMA MENDEZ  ACCT #:                  [id number]  MRN:                       [id number]  :                       1995  ADMIT DATE:       2024 11:36 PM  DISCH DATE:        2024 4:49 PM  RESPONDING PROVIDER #:        [id number]          PROVIDER RESPONSE TEXT:    acute pulmonary edema    CDI QUERY TEXT:    Clarification    Instruction: Based on your assessment of the patient and the clinical information, please provide the requested documentation by clicking on the appropriate radio button and enter any additional information if prompted.    Question: Please further clarify the etiology of pulmonary edema    When answering this query, please exercise your independent professional judgment. The fact that a question is being asked, does not imply that any particular answer is desired or expected.    The patient's clinical indicators include:  Clinical Information: 27yo F,  that presented to OSH with c/o headache, chest pain and SOB and was ultimately transferred to  d/t sPEC and pulmonary edema    Clinical Indicators:  --H/P: \"diagnosed by mild range BP and pulmonary edema on imaging\"  --DC summary: \"Was diagnosed based on pulmonary edema on imaging and elevated BP. Was given 1x IV lasix and then started on PO course x3 days. Also was started on nifed 30 daily\"    Treatment: lasix, nifed, VS/respiratory status monitored, TTE    Risk Factors: postpartum, sPEC  Options provided:  -- acute pulmonary edema  -- chronic pulmonary edema  -- Other - I will add my own diagnosis  -- Refer to Clinical Documentation Reviewer    Query created by: Jenise Joseph on 5/15/2024 4:56 PM      Electronically signed by:  CLARIEBL CARBAJAL MD 2024 7:51 AM          "

## 2024-07-25 ENCOUNTER — OFFICE VISIT (OUTPATIENT)
Dept: PRIMARY CARE | Facility: CLINIC | Age: 29
End: 2024-07-25
Payer: COMMERCIAL

## 2024-07-25 VITALS
BODY MASS INDEX: 37.45 KG/M2 | HEART RATE: 76 BPM | SYSTOLIC BLOOD PRESSURE: 120 MMHG | OXYGEN SATURATION: 96 % | WEIGHT: 238.6 LBS | DIASTOLIC BLOOD PRESSURE: 83 MMHG | HEIGHT: 67 IN

## 2024-07-25 DIAGNOSIS — Z00.00 ANNUAL PHYSICAL EXAM: Primary | ICD-10-CM

## 2024-07-25 DIAGNOSIS — Z00.00 ROUTINE GENERAL MEDICAL EXAMINATION AT A HEALTH CARE FACILITY: ICD-10-CM

## 2024-07-25 PROCEDURE — 99395 PREV VISIT EST AGE 18-39: CPT | Performed by: FAMILY MEDICINE

## 2024-07-25 PROCEDURE — 3008F BODY MASS INDEX DOCD: CPT | Performed by: FAMILY MEDICINE

## 2024-07-25 PROCEDURE — 1036F TOBACCO NON-USER: CPT | Performed by: FAMILY MEDICINE

## 2024-07-25 ASSESSMENT — PATIENT HEALTH QUESTIONNAIRE - PHQ9
1. LITTLE INTEREST OR PLEASURE IN DOING THINGS: NOT AT ALL
2. FEELING DOWN, DEPRESSED OR HOPELESS: NOT AT ALL
SUM OF ALL RESPONSES TO PHQ9 QUESTIONS 1 AND 2: 0

## 2024-07-25 NOTE — PROGRESS NOTES
"     Subjective     Patient ID: Veena Delvalle is a 29 y.o. female who presents for annual wellness .      Last Physical : 1 Years ago     Pt's PMH, PSH, SH, FH , meds and allergies was obtained / reviewed and updated .     Dental visits : Y  Vision issues : N  Hearing issues : N    Immunizations : Y    Diet :  could be better  Exercise:  Weight concerns :     Alcohol: as noted in SH  Tobacco: as noted in SH  Recreational drug use : None/ as noted in SH    Sexually active : Active   Contraception :   Menstrual problems:  Premenopausal/perimenopausal/ postmenopausal:    G:  Parity:  Full term:    Premature:   (s):   Living :  Ab induced:   Ab spontaneous :  Ectopic :   Multiple :    PAP smear :  Mammogram :  Colonoscopy:    Metabolic screening   - Lipid   - Glucose    Patient presents for physical is 2 months postpartum had severe preeclampsia was on nifedipine blood pressures normal now he also has history of thalassemia and has been and does not need an inhaler  ======================================================    Visit Vitals  /83 (BP Location: Left arm)   Pulse 76   Ht 1.702 m (5' 7\")   Wt 108 kg (238 lb 9.6 oz)   SpO2 96%   BMI 37.37 kg/m²   OB Status Recent pregnancy   Smoking Status Never   BSA 2.26 m²      No LMP recorded.     =====================================    Review of systems:  Constitutional: no chills, no fever and no night sweats.     Eyes: no blurred vision and no eyesight problems.     ENT: no hearing loss, no nasal congestion, no nasal discharge, no hoarseness and no sore throat.     Cardiovascular: no chest pain, no intermittent leg claudication, no lower extremity edema, no palpitations and no syncope.     Respiratory: no cough, no shortness of breath during exertion, no shortness of breath at rest and no wheezing.     Gastrointestinal: no abdominal pain, no blood in stools, no constipation, no diarrhea, no melena, no nausea, no rectal pain and no vomiting. "     Genitourinary: no dysuria, no change in urinary frequency, no urinary hesitancy, no feelings of urinary urgency and no vaginal discharge.     Musculoskeletal: no arthralgias, no back pain and no myalgias.     Integumentary: no new skin lesions and no rashes.     Neurological: no difficulty walking, no headache, no limb weakness, no numbness and no tingling.     Psychiatric: no anxiety, no depression, no anhedonia and no substance use disorders.     Endocrine: no recent weight gain and no recent weight loss.     Hematologic/Lymphatic: no tendency for easy bruising and no swollen glands.   ============================================================    Physical exam :    Constitutional: Alert and in no acute distress. Well developed, well nourished.     Eyes: Normal external exam. Pupils were equal in size, round, reactive to light (PERRL) with normal accommodation and extraocular movements intact (EOMI).     Ears, Nose, Mouth, and Throat: External inspection of ears and nose: Normal.  Otoscopic examination: Normal.      Neck: No neck mass was observed. Supple.     Cardiovascular: Heart rate and rhythm were normal, normal S1 and S2, no gallops, no murmurs and no pericardial rub    Pulmonary: No respiratory distress. Clear bilateral breath sounds.     Abdomen: Soft nontender; no abdominal mass palpated. No organomegaly.     Musculoskeletal: No joint swelling seen, normal movements of all extremities. Range of motion: Normal.  Muscle strength/tone: Normal.      Skin: Normal skin color and pigmentation, normal skin turgor, and no rash.     Neurologic: Deep tendon reflexes were 2+ and symmetric. Sensation: Normal.     Psychiatric: Judgment and insight: Intact. Mood and affect: Normal.    Lymphatic : Cervical/ axillary/ groin Lns Palpable/ non palpable            All other systems have been reviewed and are negative for complaint.      Assessment/Plan    Problem List Items Addressed This Visit             ICD-10-CM     Annual physical exam - Primary Z00.00    Relevant Orders    CBC (Completed)    Comprehensive Metabolic Panel (Completed)    Lipid Panel (Completed)    TSH with reflex to Free T4 if abnormal    Routine general medical examination at a health care facility Z00.00    Relevant Orders    Hemoglobin A1c (Completed)

## 2024-07-26 ENCOUNTER — LAB (OUTPATIENT)
Dept: LAB | Facility: LAB | Age: 29
End: 2024-07-26
Payer: COMMERCIAL

## 2024-07-26 DIAGNOSIS — D50.9 MICROCYTIC ANEMIA: ICD-10-CM

## 2024-07-26 DIAGNOSIS — Z00.00 ROUTINE GENERAL MEDICAL EXAMINATION AT A HEALTH CARE FACILITY: ICD-10-CM

## 2024-07-26 DIAGNOSIS — Z00.00 ANNUAL PHYSICAL EXAM: ICD-10-CM

## 2024-07-26 LAB
ALBUMIN SERPL BCP-MCNC: 4.3 G/DL (ref 3.4–5)
ALP SERPL-CCNC: 73 U/L (ref 33–110)
ALT SERPL W P-5'-P-CCNC: 12 U/L (ref 7–45)
ANION GAP SERPL CALC-SCNC: 13 MMOL/L (ref 10–20)
AST SERPL W P-5'-P-CCNC: 14 U/L (ref 9–39)
BILIRUB SERPL-MCNC: 0.5 MG/DL (ref 0–1.2)
BUN SERPL-MCNC: 12 MG/DL (ref 6–23)
CALCIUM SERPL-MCNC: 9.8 MG/DL (ref 8.6–10.6)
CHLORIDE SERPL-SCNC: 106 MMOL/L (ref 98–107)
CHOLEST SERPL-MCNC: 111 MG/DL (ref 0–199)
CHOLESTEROL/HDL RATIO: 2.7
CO2 SERPL-SCNC: 25 MMOL/L (ref 21–32)
CREAT SERPL-MCNC: 0.65 MG/DL (ref 0.5–1.05)
EGFRCR SERPLBLD CKD-EPI 2021: >90 ML/MIN/1.73M*2
ERYTHROCYTE [DISTWIDTH] IN BLOOD BY AUTOMATED COUNT: 18.6 % (ref 11.5–14.5)
EST. AVERAGE GLUCOSE BLD GHB EST-MCNC: 91 MG/DL
GLUCOSE SERPL-MCNC: 82 MG/DL (ref 74–99)
HBA1C MFR BLD: 4.8 %
HCT VFR BLD AUTO: 34.9 % (ref 36–46)
HDLC SERPL-MCNC: 41.6 MG/DL
HGB BLD-MCNC: 10.7 G/DL (ref 12–16)
LDLC SERPL CALC-MCNC: 62 MG/DL
MCH RBC QN AUTO: 18.9 PG (ref 26–34)
MCHC RBC AUTO-ENTMCNC: 30.7 G/DL (ref 32–36)
MCV RBC AUTO: 62 FL (ref 80–100)
NON HDL CHOLESTEROL: 69 MG/DL (ref 0–149)
NRBC BLD-RTO: 0 /100 WBCS (ref 0–0)
PLATELET # BLD AUTO: 399 X10*3/UL (ref 150–450)
POTASSIUM SERPL-SCNC: 4.5 MMOL/L (ref 3.5–5.3)
PROT SERPL-MCNC: 7.3 G/DL (ref 6.4–8.2)
RBC # BLD AUTO: 5.65 X10*6/UL (ref 4–5.2)
SODIUM SERPL-SCNC: 139 MMOL/L (ref 136–145)
TRIGL SERPL-MCNC: 37 MG/DL (ref 0–149)
VLDL: 7 MG/DL (ref 0–40)
WBC # BLD AUTO: 9 X10*3/UL (ref 4.4–11.3)

## 2024-07-26 PROCEDURE — 83036 HEMOGLOBIN GLYCOSYLATED A1C: CPT

## 2024-07-26 PROCEDURE — 85027 COMPLETE CBC AUTOMATED: CPT

## 2024-07-26 PROCEDURE — 82728 ASSAY OF FERRITIN: CPT

## 2024-07-26 PROCEDURE — 36415 COLL VENOUS BLD VENIPUNCTURE: CPT

## 2024-07-26 PROCEDURE — 80061 LIPID PANEL: CPT

## 2024-07-26 PROCEDURE — 80053 COMPREHEN METABOLIC PANEL: CPT

## 2024-07-26 PROCEDURE — 83550 IRON BINDING TEST: CPT

## 2024-07-26 PROCEDURE — 83540 ASSAY OF IRON: CPT

## 2024-07-28 DIAGNOSIS — Z00.00 ROUTINE GENERAL MEDICAL EXAMINATION AT A HEALTH CARE FACILITY: Primary | ICD-10-CM

## 2024-07-28 DIAGNOSIS — D50.9 MICROCYTIC ANEMIA: ICD-10-CM

## 2024-07-28 PROBLEM — J01.00 ACUTE NON-RECURRENT MAXILLARY SINUSITIS: Status: RESOLVED | Noted: 2024-01-24 | Resolved: 2024-07-28

## 2024-07-28 PROBLEM — O14.10 PRE-ECLAMPSIA, SEVERE (HHS-HCC): Status: RESOLVED | Noted: 2024-05-09 | Resolved: 2024-07-28

## 2024-07-28 PROBLEM — Z34.92 SECOND TRIMESTER PREGNANCY (HHS-HCC): Status: RESOLVED | Noted: 2024-01-24 | Resolved: 2024-07-28

## 2024-07-28 LAB
FERRITIN SERPL-MCNC: 20 NG/ML (ref 8–150)
IRON SATN MFR SERPL: 11 % (ref 25–45)
IRON SERPL-MCNC: 40 UG/DL (ref 35–150)
TIBC SERPL-MCNC: 380 UG/DL (ref 240–445)
UIBC SERPL-MCNC: 340 UG/DL (ref 110–370)

## 2024-12-13 ENCOUNTER — INITIAL PRENATAL (OUTPATIENT)
Dept: OBSTETRICS AND GYNECOLOGY | Facility: HOSPITAL | Age: 29
End: 2024-12-13
Payer: COMMERCIAL

## 2024-12-13 ENCOUNTER — LAB (OUTPATIENT)
Dept: LAB | Facility: LAB | Age: 29
End: 2024-12-13
Payer: COMMERCIAL

## 2024-12-13 ENCOUNTER — HOSPITAL ENCOUNTER (OUTPATIENT)
Dept: RADIOLOGY | Facility: HOSPITAL | Age: 29
Discharge: HOME | End: 2024-12-13
Payer: COMMERCIAL

## 2024-12-13 VITALS — SYSTOLIC BLOOD PRESSURE: 138 MMHG | DIASTOLIC BLOOD PRESSURE: 81 MMHG | BODY MASS INDEX: 39.84 KG/M2 | WEIGHT: 254.4 LBS

## 2024-12-13 DIAGNOSIS — Z32.01 PREGNANCY TEST POSITIVE (HHS-HCC): ICD-10-CM

## 2024-12-13 DIAGNOSIS — Z3A.11 11 WEEKS GESTATION OF PREGNANCY (HHS-HCC): Primary | ICD-10-CM

## 2024-12-13 LAB
ERYTHROCYTE [DISTWIDTH] IN BLOOD BY AUTOMATED COUNT: 19.5 % (ref 11.5–14.5)
HBV CORE AB SER QL: NONREACTIVE
HBV SURFACE AB SER-ACNC: <3.1 MIU/ML
HBV SURFACE AG SERPL QL IA: NONREACTIVE
HCT VFR BLD AUTO: 36.4 % (ref 36–46)
HCV AB SER QL: NONREACTIVE
HGB BLD-MCNC: 11.1 G/DL (ref 12–16)
HIV 1+2 AB+HIV1 P24 AG SERPL QL IA: NONREACTIVE
MCH RBC QN AUTO: 18.8 PG (ref 26–34)
MCHC RBC AUTO-ENTMCNC: 30.5 G/DL (ref 32–36)
MCV RBC AUTO: 62 FL (ref 80–100)
NRBC BLD-RTO: 0 /100 WBCS (ref 0–0)
PLATELET # BLD AUTO: 384 X10*3/UL (ref 150–450)
PREGNANCY TEST URINE, POC: POSITIVE
RBC # BLD AUTO: 5.9 X10*6/UL (ref 4–5.2)
REFLEX ADDED, ANEMIA PANEL: NORMAL
RUBV IGG SERPL IA-ACNC: 1.6 IA
RUBV IGG SERPL QL IA: POSITIVE
TREPONEMA PALLIDUM IGG+IGM AB [PRESENCE] IN SERUM OR PLASMA BY IMMUNOASSAY: NONREACTIVE
WBC # BLD AUTO: 10.8 X10*3/UL (ref 4.4–11.3)

## 2024-12-13 PROCEDURE — 86780 TREPONEMA PALLIDUM: CPT

## 2024-12-13 PROCEDURE — 87340 HEPATITIS B SURFACE AG IA: CPT

## 2024-12-13 PROCEDURE — 76801 OB US < 14 WKS SINGLE FETUS: CPT

## 2024-12-13 PROCEDURE — 90656 IIV3 VACC NO PRSV 0.5 ML IM: CPT | Performed by: OBSTETRICS & GYNECOLOGY

## 2024-12-13 PROCEDURE — 36415 COLL VENOUS BLD VENIPUNCTURE: CPT

## 2024-12-13 PROCEDURE — 86704 HEP B CORE ANTIBODY TOTAL: CPT

## 2024-12-13 PROCEDURE — 86317 IMMUNOASSAY INFECTIOUS AGENT: CPT

## 2024-12-13 PROCEDURE — 87389 HIV-1 AG W/HIV-1&-2 AB AG IA: CPT

## 2024-12-13 PROCEDURE — 83021 HEMOGLOBIN CHROMOTOGRAPHY: CPT

## 2024-12-13 PROCEDURE — 81025 URINE PREGNANCY TEST: CPT | Performed by: OBSTETRICS & GYNECOLOGY

## 2024-12-13 PROCEDURE — 87086 URINE CULTURE/COLONY COUNT: CPT | Performed by: OBSTETRICS & GYNECOLOGY

## 2024-12-13 PROCEDURE — 86706 HEP B SURFACE ANTIBODY: CPT

## 2024-12-13 PROCEDURE — 76817 TRANSVAGINAL US OBSTETRIC: CPT

## 2024-12-13 PROCEDURE — 83020 HEMOGLOBIN ELECTROPHORESIS: CPT | Performed by: OBSTETRICS & GYNECOLOGY

## 2024-12-13 PROCEDURE — 87591 N.GONORRHOEAE DNA AMP PROB: CPT | Performed by: OBSTETRICS & GYNECOLOGY

## 2024-12-13 PROCEDURE — 86803 HEPATITIS C AB TEST: CPT

## 2024-12-13 PROCEDURE — 85027 COMPLETE CBC AUTOMATED: CPT

## 2024-12-13 SDOH — ECONOMIC STABILITY: FOOD INSECURITY: WITHIN THE PAST 12 MONTHS, THE FOOD YOU BOUGHT JUST DIDN'T LAST AND YOU DIDN'T HAVE MONEY TO GET MORE.: NEVER TRUE

## 2024-12-13 SDOH — ECONOMIC STABILITY: FOOD INSECURITY: WITHIN THE PAST 12 MONTHS, YOU WORRIED THAT YOUR FOOD WOULD RUN OUT BEFORE YOU GOT MONEY TO BUY MORE.: NEVER TRUE

## 2024-12-13 SDOH — ECONOMIC STABILITY: TRANSPORTATION INSECURITY
IN THE PAST 12 MONTHS, HAS LACK OF TRANSPORTATION KEPT YOU FROM MEETINGS, WORK, OR FROM GETTING THINGS NEEDED FOR DAILY LIVING?: NO

## 2024-12-13 SDOH — ECONOMIC STABILITY: TRANSPORTATION INSECURITY
IN THE PAST 12 MONTHS, HAS THE LACK OF TRANSPORTATION KEPT YOU FROM MEDICAL APPOINTMENTS OR FROM GETTING MEDICATIONS?: NO

## 2024-12-13 ASSESSMENT — EDINBURGH POSTNATAL DEPRESSION SCALE (EPDS)
THE THOUGHT OF HARMING MYSELF HAS OCCURRED TO ME: NEVER
I HAVE BEEN ABLE TO LAUGH AND SEE THE FUNNY SIDE OF THINGS: AS MUCH AS I ALWAYS COULD
I HAVE LOOKED FORWARD WITH ENJOYMENT TO THINGS: AS MUCH AS I EVER DID
THINGS HAVE BEEN GETTING ON TOP OF ME: NO, I HAVE BEEN COPING AS WELL AS EVER
I HAVE BEEN SO UNHAPPY THAT I HAVE HAD DIFFICULTY SLEEPING: NOT AT ALL
I HAVE BLAMED MYSELF UNNECESSARILY WHEN THINGS WENT WRONG: NO, NEVER
I HAVE BEEN SO UNHAPPY THAT I HAVE BEEN CRYING: NO, NEVER
I HAVE FELT SCARED OR PANICKY FOR NO GOOD REASON: NO, NOT AT ALL
I HAVE BEEN ANXIOUS OR WORRIED FOR NO GOOD REASON: NO, NOT AT ALL

## 2024-12-13 NOTE — PROGRESS NOTES
Subjective   Patient ID [id number]   Veena Delvalle is a 29 y.o.  at 11w6d with a working estimated date of delivery of 2025, by Last Menstrual Period who presents for an initial prenatal visit. This pregnancy is not planned    She delivered     Her pregnancy is complicated by:  Previous C/S with twins    OB History    Para Term  AB Living   4 1 1   2 2   SAB IAB Ectopic Multiple Live Births   1     1 2      # Outcome Date GA Lbr Carlos/2nd Weight Sex Type Anes PTL Lv   4 Current            3A Term 24 37w1d   M CS-LTranv  N RACHELLE   3B Term 24 37w1d   F CS-LTranv   RACHELLE   2 SAB 2023     Complete      2013                  Objective   Physical Exam  Weight: 115 kg (254 lb 6.4 oz)  Expected Total Weight Gain: Could not be calculated   Pregravid BMI: Could not be calculated  BP: 138/81          OBGyn Exam    Prenatal Labs  today    Assessment/Plan   Diagnoses and all orders for this visit:  11 weeks gestation of pregnancy ([facility])  -     Myriad Prequel Prenatal Screen; Future  Pregnancy test positive ([facility])  -     C. trachomatis / N. gonorrhoeae, Amplified  -     CBC Anemia Panel With Reflex,Pregnancy; Future  -     Hemoglobin Identification with Path Review; Future  -     Hepatitis B Core Antibody, Total; Future  -     Hepatitis B surface Ag; Future  -     Hepatitis B Surface Antibody; Future  -     Hepatitis C Antibody; Future  -     HIV 1/2 Antigen/Antibody Screen with Reflex to Confirmation; Future  -     Rubella IgG; Future  -     Syphilis Screen with Reflex; Future  -     Urine culture  -     POCT pregnancy, urine manually resulted  -     US MAC OB imaging order; Future  -     Flu vaccine, trivalent, preservative free, age 6 months and greater (Fluarix/Fluzone/Flulaval)    Return in 4 weeks  Immunizations: flu shot   Prenatal Labs ordered  Daily prenatal vitamins prescribed  First trimester screening and second trimester screening discussed. Patient decided to get  NIPS  Follow up in 4 weeks for return OB visit.  Desired provider in labor: [] CNM  [] Physician  [] Blood Products: [] Yes, accepts [] No, needs counseling  [x] Initial BMI: Could not be calculated   [x] Prenatal Labs:   [x] Cervical Cancer Screening up to date  [x] Rh status:   [x] Genetic Screening:    [x] NT US: (11-13 wks)  [x] Baby ASA (if indicated):  [x] Pregnancy dated by:  LMP    [] Anatomy US: (19-20 wks)  [] Federal Sterilization consent signed (if indicated):  [] 1hr GCT at 24-28wks:  [] Rhogam (if indicated):   [] Fetal Surveillance (if indicated):  [] Tdap (27-32 wks, may be given up to 36 wks if initial window missed):   [] RSV (32-36 wks) (Sept. to end of Jan):   [] Flu Vaccine:    [] Breastfeeding:  [] Postpartum Birth control method:   [] GBS at 36 - 37 wks:  [] 39 weeks discussion of IOL vs. Expectant management:  [] Mode of delivery ( anticipated ):

## 2024-12-14 LAB
BACTERIA UR CULT: NORMAL
C TRACH RRNA SPEC QL NAA+PROBE: NEGATIVE
N GONORRHOEA DNA SPEC QL PROBE+SIG AMP: NEGATIVE

## 2024-12-16 LAB
HEMOGLOBIN A2: 5.6 % (ref 2–3.5)
HEMOGLOBIN A: 91.7 % (ref 95.8–98)
HEMOGLOBIN F: 2.7 % (ref 0–2)
HEMOGLOBIN IDENTIFICATION INTERPRETATION: ABNORMAL
PATH REVIEW-HGB IDENTIFICATION: ABNORMAL

## 2024-12-20 ENCOUNTER — HOSPITAL ENCOUNTER (OUTPATIENT)
Dept: RADIOLOGY | Facility: HOSPITAL | Age: 29
Discharge: HOME | End: 2024-12-20
Payer: COMMERCIAL

## 2024-12-20 DIAGNOSIS — O99.210 OBESITY AFFECTING PREGNANCY (HHS-HCC): ICD-10-CM

## 2024-12-20 DIAGNOSIS — Z36.82 ENCOUNTER FOR NUCHAL TRANSLUCENCY TESTING (HHS-HCC): ICD-10-CM

## 2024-12-20 DIAGNOSIS — Z32.01 PREGNANCY TEST POSITIVE (HHS-HCC): ICD-10-CM

## 2024-12-20 PROCEDURE — 76816 OB US FOLLOW-UP PER FETUS: CPT

## 2024-12-20 PROCEDURE — 76817 TRANSVAGINAL US OBSTETRIC: CPT

## 2025-01-08 ENCOUNTER — APPOINTMENT (OUTPATIENT)
Dept: OBSTETRICS AND GYNECOLOGY | Facility: HOSPITAL | Age: 30
End: 2025-01-08
Payer: COMMERCIAL

## 2025-02-05 ENCOUNTER — APPOINTMENT (OUTPATIENT)
Dept: RADIOLOGY | Facility: HOSPITAL | Age: 30
End: 2025-02-05
Payer: COMMERCIAL

## 2025-02-07 ENCOUNTER — APPOINTMENT (OUTPATIENT)
Dept: OBSTETRICS AND GYNECOLOGY | Facility: HOSPITAL | Age: 30
End: 2025-02-07
Payer: COMMERCIAL

## 2025-07-21 ASSESSMENT — PROMIS GLOBAL HEALTH SCALE
EMOTIONAL_PROBLEMS: RARELY
RATE_AVERAGE_PAIN: 5
RATE_PHYSICAL_HEALTH: FAIR
RATE_SOCIAL_SATISFACTION: FAIR
RATE_QUALITY_OF_LIFE: GOOD
CARRYOUT_SOCIAL_ACTIVITIES: GOOD
RATE_MENTAL_HEALTH: GOOD
RATE_GENERAL_HEALTH: FAIR
CARRYOUT_PHYSICAL_ACTIVITIES: COMPLETELY
RATE_AVERAGE_FATIGUE: MILD

## 2025-07-22 ENCOUNTER — APPOINTMENT (OUTPATIENT)
Dept: PRIMARY CARE | Facility: CLINIC | Age: 30
End: 2025-07-22
Payer: COMMERCIAL

## 2025-07-22 VITALS
BODY MASS INDEX: 41.59 KG/M2 | SYSTOLIC BLOOD PRESSURE: 122 MMHG | HEART RATE: 82 BPM | WEIGHT: 265 LBS | HEIGHT: 67 IN | DIASTOLIC BLOOD PRESSURE: 78 MMHG

## 2025-07-22 DIAGNOSIS — Z00.00 ROUTINE GENERAL MEDICAL EXAMINATION AT A HEALTH CARE FACILITY: Primary | ICD-10-CM

## 2025-07-22 DIAGNOSIS — Z13.29 SCREENING FOR THYROID DISORDER: ICD-10-CM

## 2025-07-22 DIAGNOSIS — Z13.1 SCREENING FOR DIABETES MELLITUS: ICD-10-CM

## 2025-07-22 DIAGNOSIS — Z13.220 SCREENING FOR LIPID DISORDERS: ICD-10-CM

## 2025-07-22 DIAGNOSIS — E66.01 MORBID OBESITY WITH BMI OF 40.0-44.9, ADULT (MULTI): ICD-10-CM

## 2025-07-22 PROCEDURE — 3008F BODY MASS INDEX DOCD: CPT | Performed by: FAMILY MEDICINE

## 2025-07-22 PROCEDURE — 99395 PREV VISIT EST AGE 18-39: CPT | Performed by: FAMILY MEDICINE

## 2025-07-22 ASSESSMENT — PATIENT HEALTH QUESTIONNAIRE - PHQ9
2. FEELING DOWN, DEPRESSED OR HOPELESS: NOT AT ALL
SUM OF ALL RESPONSES TO PHQ9 QUESTIONS 1 AND 2: 0
1. LITTLE INTEREST OR PLEASURE IN DOING THINGS: NOT AT ALL

## 2025-07-22 NOTE — PROGRESS NOTES
"     Subjective     Patient ID: Veena Delvalle is a 30 y.o. female who presents for Annual Exam.      Last Physical : 1 Years ago     Pt's PMH, PSH, SH, FH , meds and allergies was obtained / reviewed and updated .     Dental visits : Y  Vision issues : N  Hearing issues : N    Immunizations : Y    Diet :  could be better  Exercise:  Weight concerns :     Alcohol: as noted in SH  Tobacco: as noted in SH  Recreational drug use : None/ as noted in SH    Sexually active : Active   Contraception :   Menstrual problems:  Premenopausal/perimenopausal/ postmenopausal:    G:  Parity:  Full term:    Premature:   (s):   Living :  Ab induced:   Ab spontaneous :  Ectopic :   Multiple :    PAP smear :  Mammogram :  Colonoscopy:    Metabolic screening   - Lipid   - Glucose    Patient presents for physical  was on nifedipine blood pressures normal now has not taken medication since last year he also has history of thalassemia and has been and does not need an inhaler  ======================================================    Visit Vitals  /78   Pulse 82   Ht 1.702 m (5' 7\")   Wt 120 kg (265 lb)   LMP 2024   BMI 41.50 kg/m²   OB Status Pregnant   Smoking Status Never   BSA 2.38 m²      Patient's last menstrual period was 2024.     =====================================    Review of systems:  Constitutional: no chills, no fever and no night sweats.     Eyes: no blurred vision and no eyesight problems.     ENT: no hearing loss, no nasal congestion, no nasal discharge, no hoarseness and no sore throat.     Cardiovascular: no chest pain, no intermittent leg claudication, no lower extremity edema, no palpitations and no syncope.     Respiratory: no cough, no shortness of breath during exertion, no shortness of breath at rest and no wheezing.     Gastrointestinal: no abdominal pain, no blood in stools, no constipation, no diarrhea, no melena, no nausea, no rectal pain and no vomiting.     Genitourinary: no " dysuria, no change in urinary frequency, no urinary hesitancy, no feelings of urinary urgency and no vaginal discharge.     Musculoskeletal: no arthralgias, no back pain and no myalgias.     Integumentary: no new skin lesions and no rashes.     Neurological: no difficulty walking, no headache, no limb weakness, no numbness and no tingling.     Psychiatric: no anxiety, no depression, no anhedonia and no substance use disorders.     Endocrine: no recent weight gain and no recent weight loss.     Hematologic/Lymphatic: no tendency for easy bruising and no swollen glands.   ============================================================    Physical exam :    Constitutional: Alert and in no acute distress. Well developed, well nourished.     Eyes: Normal external exam. Pupils were equal in size, round, reactive to light (PERRL) with normal accommodation and extraocular movements intact (EOMI).     Ears, Nose, Mouth, and Throat: External inspection of ears and nose: Normal.  Otoscopic examination: Normal.      Neck: No neck mass was observed. Supple.     Cardiovascular: Heart rate and rhythm were normal, normal S1 and S2, no gallops, no murmurs and no pericardial rub    Pulmonary: No respiratory distress. Clear bilateral breath sounds.     Abdomen: Soft nontender; no abdominal mass palpated. No organomegaly.     Musculoskeletal: No joint swelling seen, normal movements of all extremities. Range of motion: Normal.  Muscle strength/tone: Normal.      Skin: Normal skin color and pigmentation, normal skin turgor, and no rash.     Neurologic: Deep tendon reflexes were 2+ and symmetric. Sensation: Normal.     Psychiatric: Judgment and insight: Intact. Mood and affect: Normal.    Lymphatic : Cervical/ axillary/ groin Lns Palpable/ non palpable            All other systems have been reviewed and are negative for complaint.      Assessment/Plan    Problem List Items Addressed This Visit           ICD-10-CM    Routine general medical  examination at a health care facility - Primary Z00.00    Relevant Orders    Lipid Panel (Completed)    CBC (Completed)    TSH with reflex to Free T4 if abnormal (Completed)    Hemoglobin A1C (Completed)    Comprehensive Metabolic Panel (Completed)    Screening for diabetes mellitus Z13.1    Relevant Orders    Lipid Panel (Completed)    CBC (Completed)    TSH with reflex to Free T4 if abnormal (Completed)    Hemoglobin A1C (Completed)    Comprehensive Metabolic Panel (Completed)    Screening for thyroid disorder Z13.29    Relevant Orders    Lipid Panel (Completed)    CBC (Completed)    TSH with reflex to Free T4 if abnormal (Completed)    Hemoglobin A1C (Completed)    Comprehensive Metabolic Panel (Completed)    Screening for lipid disorders Z13.220    Relevant Orders    Lipid Panel (Completed)    CBC (Completed)    TSH with reflex to Free T4 if abnormal (Completed)    Hemoglobin A1C (Completed)    Comprehensive Metabolic Panel (Completed)    Morbid obesity with BMI of 40.0-44.9, adult (Multi) E66.01, Z68.41    Watch what you eat and include more vegetables on your plate.  Portion control and exercise will help in loosing weight .   It is recommended to get 150 mins of brisk exercise in a week . 150 mins of exercise per week will help in maintaining your current weight, if you are already working out . Exercise should make your heart rate go up.   Calorie deficit ( spending more calories than eating ) will result in weight loss    A deficit of 500 calories per day in 7 days would results in 1lbs weight loss per week., Aim for 1-2 lbs weight loss per month.   You can reduce the calorie intake by 250 calories and spend 250 calories by working out which would give you a total deficit of 500 calories     No sugary/ sweetened beverages , portion control , dedicated physical activity atleast 5 times a week advised .

## 2025-07-25 LAB
ALBUMIN SERPL-MCNC: 4.1 G/DL (ref 3.6–5.1)
ALP SERPL-CCNC: 88 U/L (ref 31–125)
ALT SERPL-CCNC: 12 U/L (ref 6–29)
ANION GAP SERPL CALCULATED.4IONS-SCNC: 10 MMOL/L (CALC) (ref 7–17)
AST SERPL-CCNC: 13 U/L (ref 10–30)
BILIRUB SERPL-MCNC: 0.6 MG/DL (ref 0.2–1.2)
BUN SERPL-MCNC: 11 MG/DL (ref 7–25)
CALCIUM SERPL-MCNC: 9.2 MG/DL (ref 8.6–10.2)
CHLORIDE SERPL-SCNC: 107 MMOL/L (ref 98–110)
CHOLEST SERPL-MCNC: 114 MG/DL
CHOLEST/HDLC SERPL: 2.4 (CALC)
CO2 SERPL-SCNC: 23 MMOL/L (ref 20–32)
CREAT SERPL-MCNC: 0.62 MG/DL (ref 0.5–0.96)
EGFRCR SERPLBLD CKD-EPI 2021: 124 ML/MIN/1.73M2
ERYTHROCYTE [DISTWIDTH] IN BLOOD BY AUTOMATED COUNT: 16.9 % (ref 11–15)
EST. AVERAGE GLUCOSE BLD GHB EST-MCNC: 97 MG/DL
EST. AVERAGE GLUCOSE BLD GHB EST-SCNC: 5.4 MMOL/L
GLUCOSE SERPL-MCNC: 75 MG/DL (ref 65–99)
HBA1C MFR BLD: 5 %
HCT VFR BLD AUTO: 39 % (ref 35–45)
HDLC SERPL-MCNC: 47 MG/DL
HGB BLD-MCNC: 11.6 G/DL (ref 11.7–15.5)
LDLC SERPL CALC-MCNC: 54 MG/DL (CALC)
MCH RBC QN AUTO: 20.2 PG (ref 27–33)
MCHC RBC AUTO-ENTMCNC: 29.7 G/DL (ref 32–36)
MCV RBC AUTO: 68.1 FL (ref 80–100)
NONHDLC SERPL-MCNC: 67 MG/DL (CALC)
PLATELET # BLD AUTO: 419 THOUSAND/UL (ref 140–400)
PMV BLD REES-ECKER: 11.9 FL (ref 7.5–12.5)
POTASSIUM SERPL-SCNC: 4.3 MMOL/L (ref 3.5–5.3)
PROT SERPL-MCNC: 6.9 G/DL (ref 6.1–8.1)
RBC # BLD AUTO: 5.73 MILLION/UL (ref 3.8–5.1)
SODIUM SERPL-SCNC: 140 MMOL/L (ref 135–146)
TRIGL SERPL-MCNC: 47 MG/DL
TSH SERPL-ACNC: 2.04 MIU/L
WBC # BLD AUTO: 10.7 THOUSAND/UL (ref 3.8–10.8)

## 2025-07-27 PROBLEM — Z13.1 SCREENING FOR DIABETES MELLITUS: Status: ACTIVE | Noted: 2025-07-27

## 2025-07-27 PROBLEM — E66.01 MORBID OBESITY WITH BMI OF 40.0-44.9, ADULT (MULTI): Status: ACTIVE | Noted: 2025-07-27

## 2025-07-27 PROBLEM — Z13.220 SCREENING FOR LIPID DISORDERS: Status: ACTIVE | Noted: 2025-07-27

## 2025-07-27 PROBLEM — Z13.29 SCREENING FOR THYROID DISORDER: Status: ACTIVE | Noted: 2025-07-27
